# Patient Record
Sex: FEMALE | URBAN - METROPOLITAN AREA
[De-identification: names, ages, dates, MRNs, and addresses within clinical notes are randomized per-mention and may not be internally consistent; named-entity substitution may affect disease eponyms.]

---

## 2023-03-02 ENCOUNTER — TELEPHONE (OUTPATIENT)
Dept: OBGYN CLINIC | Facility: CLINIC | Age: 63
End: 2023-03-02

## 2023-03-02 NOTE — TELEPHONE ENCOUNTER
Caller: Patient     Doctor: Yonathan Workman    Reason for call: Calling to make apt for rheum , offered next avail and declined     Call back#: na